# Patient Record
Sex: FEMALE | Race: WHITE | NOT HISPANIC OR LATINO | URBAN - METROPOLITAN AREA
[De-identification: names, ages, dates, MRNs, and addresses within clinical notes are randomized per-mention and may not be internally consistent; named-entity substitution may affect disease eponyms.]

---

## 2020-07-20 ENCOUNTER — APPOINTMENT (OUTPATIENT)
Dept: URBAN - METROPOLITAN AREA CLINIC 200 | Age: 25
Setting detail: DERMATOLOGY
End: 2020-07-23

## 2020-07-20 DIAGNOSIS — L56.2 PHOTOCONTACT DERMATITIS [BERLOQUE DERMATITIS]: ICD-10-CM

## 2020-07-20 PROCEDURE — OTHER CONSENT FOR TELEMEDICINE VISIT OBTAINED: OTHER

## 2020-07-20 PROCEDURE — OTHER COUNSELING: OTHER

## 2020-07-20 PROCEDURE — OTHER PRESCRIPTION: OTHER

## 2020-07-20 PROCEDURE — OTHER TELEHEALTH ASSESSMENT: OTHER

## 2020-07-20 PROCEDURE — OTHER REASON FOR TELEMEDICINE VISIT: OTHER

## 2020-07-20 PROCEDURE — OTHER RECOMMENDATIONS: OTHER

## 2020-07-20 PROCEDURE — 99201: CPT | Mod: 95

## 2020-07-20 RX ORDER — TRIAMCINOLONE ACETONIDE 0.25 MG/G
CREAM TOPICAL
Qty: 1 | Refills: 4 | Status: ERX | COMMUNITY
Start: 2020-07-20

## 2020-07-20 ASSESSMENT — LOCATION ZONE DERM
LOCATION ZONE: HAND
LOCATION ZONE: FINGER

## 2020-07-20 ASSESSMENT — LOCATION SIMPLE DESCRIPTION DERM
LOCATION SIMPLE: RIGHT HAND
LOCATION SIMPLE: RIGHT SMALL FINGER

## 2020-07-20 ASSESSMENT — LOCATION DETAILED DESCRIPTION DERM
LOCATION DETAILED: RIGHT PROXIMAL DORSAL SMALL FINGER
LOCATION DETAILED: 4TH WEB SPACE RIGHT HAND

## 2021-08-24 ENCOUNTER — APPOINTMENT (OUTPATIENT)
Dept: URBAN - METROPOLITAN AREA CLINIC 200 | Age: 26
Setting detail: DERMATOLOGY
End: 2021-08-24

## 2021-08-24 DIAGNOSIS — D18.0 HEMANGIOMA: ICD-10-CM

## 2021-08-24 DIAGNOSIS — D22 MELANOCYTIC NEVI: ICD-10-CM

## 2021-08-24 DIAGNOSIS — D485 NEOPLASM OF UNCERTAIN BEHAVIOR OF SKIN: ICD-10-CM

## 2021-08-24 DIAGNOSIS — Z11.52 ENCOUNTER FOR SCREENING FOR COVID-19: ICD-10-CM

## 2021-08-24 PROBLEM — D22.39 MELANOCYTIC NEVI OF OTHER PARTS OF FACE: Status: ACTIVE | Noted: 2021-08-24

## 2021-08-24 PROBLEM — D48.5 NEOPLASM OF UNCERTAIN BEHAVIOR OF SKIN: Status: ACTIVE | Noted: 2021-08-24

## 2021-08-24 PROBLEM — D18.01 HEMANGIOMA OF SKIN AND SUBCUTANEOUS TISSUE: Status: ACTIVE | Noted: 2021-08-24

## 2021-08-24 PROCEDURE — OTHER SCREENING FOR COVID-19: OTHER

## 2021-08-24 PROCEDURE — 11102 TANGNTL BX SKIN SINGLE LES: CPT

## 2021-08-24 PROCEDURE — OTHER REASSURANCE: OTHER

## 2021-08-24 PROCEDURE — OTHER COUNSELING: OTHER

## 2021-08-24 PROCEDURE — OTHER BIOPSY BY SHAVE METHOD: OTHER

## 2021-08-24 PROCEDURE — OTHER RECOMMENDATIONS: OTHER

## 2021-08-24 PROCEDURE — 99212 OFFICE O/P EST SF 10 MIN: CPT | Mod: 25

## 2021-08-24 ASSESSMENT — LOCATION DETAILED DESCRIPTION DERM
LOCATION DETAILED: LEFT CENTRAL MALAR CHEEK
LOCATION DETAILED: RIGHT MEDIAL INFERIOR EYELID
LOCATION DETAILED: LEFT SUPERIOR LATERAL LOWER BACK
LOCATION DETAILED: PERIUMBILICAL SKIN

## 2021-08-24 ASSESSMENT — PAIN INTENSITY VAS: HOW INTENSE IS YOUR PAIN 0 BEING NO PAIN, 10 BEING THE MOST SEVERE PAIN POSSIBLE?: NO PAIN

## 2021-08-24 ASSESSMENT — LOCATION SIMPLE DESCRIPTION DERM
LOCATION SIMPLE: ABDOMEN
LOCATION SIMPLE: LEFT LOWER BACK
LOCATION SIMPLE: LEFT CHEEK
LOCATION SIMPLE: RIGHT INFERIOR EYELID

## 2021-08-24 ASSESSMENT — LOCATION ZONE DERM
LOCATION ZONE: EYELID
LOCATION ZONE: FACE
LOCATION ZONE: TRUNK

## 2021-08-24 NOTE — PROCEDURE: BIOPSY BY SHAVE METHOD
Hide Additional Anticipated Plan?: No
Silver Nitrate Text: The wound bed was treated with silver nitrate after the biopsy was performed.
Biopsy Type: H and E
Depth Of Biopsy: dermis
X Size Of Lesion In Cm: 0
Validate Note Data (See Information Below): Yes
Post-Care Instructions: I reviewed with the patient in detail post-care instructions. Patient is to keep the biopsy site dry overnight, and then apply bacitracin twice daily until healed. Patient may apply hydrogen peroxide soaks to remove any crusting.
Hemostasis: Drysol
Electrodesiccation Text: The wound bed was treated with electrodesiccation after the biopsy was performed.
Consent: Written consent was obtained and risks were reviewed including but not limited to scarring, infection, bleeding, scabbing, incomplete removal, nerve damage and allergy to anesthesia.
Wound Care: Aquaphor
Biopsy Method: sterile single edge surgical blade
Notification Instructions: Patient will be notified of biopsy results. However, patient instructed to call the office if not contacted within 2 weeks.
Type Of Destruction Used: Curettage
Anesthesia Type: 0.5% lidocaine with 1:200,000 epinephrine
Information: Selecting Yes will display possible errors in your note based on the variables you have selected. This validation is only offered as a suggestion for you. PLEASE NOTE THAT THE VALIDATION TEXT WILL BE REMOVED WHEN YOU FINALIZE YOUR NOTE. IF YOU WANT TO FAX A PRELIMINARY NOTE YOU WILL NEED TO TOGGLE THIS TO 'NO' IF YOU DO NOT WANT IT IN YOUR FAXED NOTE.
Dressing: bandage
Anesthesia Volume In Cc (Will Not Render If 0): 0.5
Billing Type: Third-Party Bill
Curettage Text: The wound bed was treated with curettage after the biopsy was performed.
Cryotherapy Text: The wound bed was treated with cryotherapy after the biopsy was performed.
Electrodesiccation And Curettage Text: The wound bed was treated with electrodesiccation and curettage after the biopsy was performed.
Detail Level: Detailed

## 2021-08-24 NOTE — PROCEDURE: RECOMMENDATIONS
Render Risk Assessment In Note?: yes
Detail Level: Simple
Recommendations (Free Text): Electrolysis for hair removal in mole- referred to Mik[facility]

## 2022-10-17 ENCOUNTER — TELEPHONE (OUTPATIENT)
Dept: GENETICS | Facility: HOSPITAL | Age: 27
End: 2022-10-17
Payer: COMMERCIAL

## 2022-10-17 NOTE — TELEPHONE ENCOUNTER
I left a message for patient to remind them of their genetic counseling appointment tomorrow. I provided directions and gave the number to my direct line.

## 2022-10-18 ENCOUNTER — CLINICAL SUPPORT (OUTPATIENT)
Dept: GENETICS | Facility: HOSPITAL | Age: 27
End: 2022-10-18
Attending: INTERNAL MEDICINE
Payer: COMMERCIAL

## 2022-10-18 DIAGNOSIS — Z71.83 ENCOUNTER FOR NONPROCREATIVE GENETIC COUNSELING: Primary | ICD-10-CM

## 2022-10-18 DIAGNOSIS — Z82.49 FAMILY HISTORY OF BRUGADA SYNDROME: ICD-10-CM

## 2022-10-18 PROCEDURE — 36415 COLL VENOUS BLD VENIPUNCTURE: CPT

## 2022-10-18 PROCEDURE — 96040 HC GENETICS COUNSELING SESSIONS: CPT

## 2022-10-18 NOTE — PROGRESS NOTES
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  Duyen was referred by her father and presented to the session alone.    Personal History:  Past medical, surgical, social and reproductive histories were obtained, reviewed and recorded.     Duyen Carl is a 27 y.o. female of Serbian, Romanian, [place], Slovakian and English descent with primary visit diagnosis of Encounter for nonprocreative genetic counseling [Z71.83]. She has not undergone cardiac evaluation. She does not believe she has had an ECG for any reason. She denies a history of syncope, pre-syncope, palpitations, arrhythmia, seizures or cardiac arrest.    No past medical history on file.   No past medical history pertinent negatives.  Past Surgical History:   Procedure Laterality Date    Lasik      Tonsillectomy       No past surgical history pertinent negatives on file.       Family History:  A detailed family history, including three generations, was reviewed.  See completed family history in pedigree below. Didier oldest paternal uncle underwent genetic testing due to a history of type I Brugada pattern on ECG; her uncle's genetic testing identified a likely pathogenic mutation in the SCN5A gene called c.2674T>A (p.Gtx935Umx), a variant of uncertain significance in the BRAF gene called c.1024A>G (p.Efs350Rwd), a variant of uncertain significance in the TRPM4 gene called c.2665del (p.Zhw188Nmtbj*35), a benign pseudodeficiency allele in the GAA gene called c.271G>A (p.Khp93Wae) and a benign pseudodeficiency allele in the GLA gene called  c.937G>T (p.Dgt713Uik) ([facility] Accession [id number]). Didier father then underwent genetic testing of the SCN5A gene and  was found to have the same mutation, c.2674T>A (p.Ijc861Tbr) ([facility] Accession [id number]).        Genetic Education/Risk Assessment/Counseling:  Information was provided about the relationship between genes and cardiovascular risk. Natural history, risks and inheritance patterns in conjuction with the SCN5A gene were reviewed, as related to Duyen's personal and/or family history.  Related psychosocial aspects were discussed.    Discussion of Genetic Testing:  The pros, cons, and limitations of testing for genetic susceptibility were discussed, including, but not limited to test options, possible results, potential impact on management, and psychosocial aspects.  There may be limited data on the degree of cardiovascular- risk and/or no defined management guidelines associated with some genes.     Given the reported personal and/or family history, genetic testing was offered and accepted. The following testing was ordered:    LAB: [facility]  TEST: SCN5A sequencing and deletion/duplication analysis    Plan:  Duyen Carl was confirmed to have understood the aforementioned information and was assisted with decision making as needed.  Informational and supportive resources were provided. Consent was obtained to share chart note(s) with physicians. Duyen will be contacted via telephone when genetic test results are available. Duyen should contact the program with personal/family history updates and/or to inquire about new information specific to this case.    A total of 30 minutes was spent providing genetic counseling to Duyen.

## 2022-10-18 NOTE — LETTER
10/18/22    Ion Martinez MD  [address] [address]      Dear Dr. Martinez,    I am writing to confirm that your patient, Duyen Carl, received care through my office on 10/18/22. I have enclosed a summary of the care provided to Duyen for your reference.    Please contact me with any questions you may have regarding the visit.    Sincerely,           Shira Pete [facility]      CC: No Recipients

## 2022-10-27 ENCOUNTER — TELEPHONE (OUTPATIENT)
Dept: GENETICS | Age: 27
End: 2022-10-27
Payer: COMMERCIAL

## 2022-10-27 NOTE — TELEPHONE ENCOUNTER
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 Genetic testing was performed. Duyen was contacted by telephone today to discuss genetic test results.    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  This is referred to as a positive result and is associated with genetic risk for Brugada syndrome.          Brugada Syndrome  Brugada syndrome is a condition that causes an irregular heart rhythm (arrhythmia) which can be inherited. Electrical impulses in individuals with Brugada syndrome may become uncoordinated between the two lower chambers of the heart (ventricles) causing decreased blood flow to the heart and brain. This decreased blood flow may result in difficulty breathing, fainting, seizures or sudden death. Symptoms of Brugada syndrome can occur at any age in both men and women, however symptoms usually begin around age 40 and more commonly in men. The severity of symptoms varies between individuals, including those within the same family. Sudden unexplained nocturnal death syndrome (SUNDS) is a condition associated with Brugada syndrome, which is characterized by unexpected cardiac arrest in young adults, usually while at rest or while sleeping. Sudden infant death syndrome (SIDS) is also associated with Brugada syndrome, which is sudden unexplained death in children under age 1, usually during sleep.    A genetic alteration,  called a pathogenic mutation, in one of several genes can cause Brugada syndrome. Mutations are most commonly found in the SCN5A gene.    SCN5A Gene  The SCN5A gene provides instructions for making sodium channels, which transport positively charged sodium atoms (ions) into heart muscle cells. Pathogenic mutations within the SCN5A gene alter the structure or function of the channel, reducing the flow of sodium ions into heart muscle cells, which can lead to an abnormal heart rhythm.    SCN5A mutations are usually inherited in a dominant manner, in which one copy of the SCN5A gene with a pathogenic mutation is inherited from one parent, and a second copy of the SCN5A gene without a mutation is inherited from the other parent. First-degree relatives (parents, siblings and children) each have a 50% chance of having the same mutation. Rarely SCN5A mutations are de melida (mutations are not present in either parent).      Plan:  Upon learning the genetic test result, Duyen elected to schedule a telemedicine follow up appointment on 11/9/2022 to discuss the result and risk-based management guidelines in more detail with program medical director, Juan Head DO. Duyen was encouraged to contact the Cardiovascular Risk Assessment and Genetics Program at  [phone] with questions prior to the appointment.

## 2022-10-27 NOTE — LETTER
10/27/22    Ion Martinez MD  [address] [address]      Dear Dr. Martinez,    I am writing to confirm that your patient, Duyen Carl, received care through my office on 10/27/22. I have enclosed a summary of the care provided to Duyen for your reference.    Please contact me with any questions you may have regarding the visit.    Sincerely,           Shira Pete [facility]      CC: No Recipients

## 2022-10-28 LAB
ABNORMALITY: ABNORMAL
LYMPH SUBSET INTERP BLD FC-IMP: ABNORMAL
SCAN RESULT: ABNORMAL

## 2022-11-16 ENCOUNTER — TELEMEDICINE (OUTPATIENT)
Dept: GENETICS | Facility: HOSPITAL | Age: 27
End: 2022-11-16
Attending: INTERNAL MEDICINE
Payer: COMMERCIAL

## 2022-11-16 ENCOUNTER — TELEPHONE (OUTPATIENT)
Dept: CARDIOLOGY | Facility: CLINIC | Age: 27
End: 2022-11-16
Payer: COMMERCIAL

## 2022-11-16 DIAGNOSIS — Z15.89 MONOALLELIC MUTATION OF SCN5A GENE: ICD-10-CM

## 2022-11-16 DIAGNOSIS — Z71.83 ENCOUNTER FOR NONPROCREATIVE GENETIC COUNSELING: Primary | ICD-10-CM

## 2022-11-16 PROCEDURE — 99214 OFFICE O/P EST MOD 30 MIN: CPT | Mod: 95 | Performed by: INTERNAL MEDICINE

## 2022-11-16 NOTE — PROGRESS NOTES
Date of Service: 2022  Patient Name: Duyen Carl  : 1995         Telemedicine Consent:    Prior to commencing the session, Duyen Carl provided verbal consent to have genetic counseling via telemedicine using [employer], which is a telemedicine platform being utilized to provide care during the COVID-19 pandemic. Duyen Carl understands the session will be billed to insurance or to them directly if uninsured or if not covered by insurance. Duyen Carl was informed that the clinician is the only provider on?the video conference, sessions are not recorded by the clinician, and the patient is not permitted to record the session.? She was provided a unique meeting ID prior to session. The clinician confirmed identification of patient by name and birthdate, confirmed patient location, support person(s) present, and obtained a callback number in case disconnected.     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 Duyen Carl was contacted by telemedicine encounter today to discuss genetic test results, management guidelines and any potential additional test options. Follow up appointments to discuss the results in more detail with our medical director may be scheduled by contacting the Cardiovascular Risk Assessment and Genetics Program. 30 minutes were spent on this date of service performing the following activities: preparing for visit, reviewing records, independently reviewing studies, obtaining history, communicating results, coordinating care, providing counseling and education and documenting.     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  This is referred to as a positive result and is associated with genetic risk for Brugada syndrome.            Brugada Syndrome  Brugada syndrome is a condition that causes an irregular heart rhythm (arrhythmia) which can be inherited. Electrical impulses in individuals with Brugada syndrome may become uncoordinated between the two lower chambers of the heart (ventricles) causing decreased blood flow to the heart and brain. This decreased blood flow may result in difficulty breathing, fainting, seizures or sudden death. Symptoms of Brugada syndrome can occur at any age in both men and women, however symptoms usually begin around age 40 and more commonly in men. The severity of symptoms varies between individuals, including those within the same family. Sudden unexplained nocturnal death syndrome (SUNDS) is a condition associated with Brugada syndrome, which is characterized by unexpected cardiac arrest in young adults, usually while at rest or while sleeping. Sudden infant death syndrome (SIDS) is also associated with Brugada syndrome, which is sudden unexplained death in children under age 1, usually during sleep.     A genetic alteration, called a pathogenic mutation, in one of several genes can cause Brugada syndrome. Mutations are most commonly found in the SCN5A gene.     SCN5A Gene  The SCN5A gene provides instructions for making sodium channels, which transport positively charged sodium atoms (ions) into heart muscle cells. Pathogenic mutations within the SCN5A gene alter the structure or function of the channel, reducing the flow of sodium ions into heart muscle cells, which can lead to an abnormal heart rhythm.     SCN5A mutations are usually inherited in a dominant  manner, in which one copy of the SCN5A gene with a pathogenic mutation is inherited from one parent, and a second copy of the SCN5A gene without a mutation is inherited from the other parent. First-degree relatives (parents, siblings and children) each have a 50% chance of having the same mutation. Rarely SCN5A mutations are de melida (mutations are not present in either parent).      Personal History:   Duyen is a 27 y.o. female of Kosovan, Cymro, [place], Slovakian and English descent . She has not undergone cardiac evaluation. She does not believe she has had an ECG for any reason. She denies a history of syncope, pre-syncope, palpitations, arrhythmia, seizures or cardiac arrest.    No past medical history on file.  No past medical history pertinent negatives.      Past Surgical History:   Procedure Laterality Date    Lasik      Tonsillectomy           Social History     Tobacco Use    Smoking status: Never   Substance Use Topics    Alcohol use: Yes     Alcohol/week: 0.0 - 1.0 standard drinks    Drug use: Never       Family History:  A detailed family history, including three generations, was reviewed.  See completed family history in pedigree below. Didier oldest paternal uncle underwent genetic testing due to a history of type I Brugada pattern on ECG; her uncle's genetic testing identified a likely pathogenic mutation in the SCN5A gene called c.2674T>A (p.Xjo923Wqd), a variant of uncertain significance in the BRAF gene called c.1024A>G (p.Bgt946Dlc), a variant of uncertain significance in the TRPM4 gene called c.2665del (p.Kkb257Qmyjs*35), a benign pseudodeficiency allele in the GAA gene called c.271G>A (p.Lru57Vjb) and a benign pseudodeficiency allele in the GLA gene called  c.937G>T (p.Nek095Bww) ([facility] Accession [id number]). Didier father then underwent genetic testing of the SCN5A gene and was found to have the same mutation, c.2674T>A (p.Cbv667Alq) ([facility] Accession  [id number]).        Risk assessment and management:  Based on the genetic finding and the personal/family history obtained, the following strategies for cardiovascular risk management were reviewed.    Screening Guidelines for Brugada Syndrome:  Guidelines written by the Heart Rhythm Society (HRS), the  Heart Rhythm Association (EHRA), and the [place] Heart Rhythm Society (APHRS) on the management of Brugada syndrome are reviewed below [Brent et al, 2013].  Additional cardiovascular screening for individuals with Brugada syndrome may be warranted based on personal and/or family history or other clinical factors and should be modified on a case by case basis. Genetic counseling was provided to facilitate decision-making regarding health maintenance.      1.  The following lifestyle changes are recommended in all patients with diagnosis of Brugada syndrome:  a) Avoidance of drugs that may induce or aggravate ST-segment elevation in right precordial leads (for example, visit http://www.Brugadadrugs.org)  b) Avoidance of excessive alcohol intake.  c) Immediate treatment of fever with antipyretic drugs.  2.  ICD implantation is recommended in patients with a diagnosis of Brugada syndrome who:  a) Are survivors of a cardiac arrest and/or  b) Have documented spontaneous sustained ventricular tachycardia with or without syncope.      3.  ICD implantation can be useful in patients with a spontaneous diagnostic type I ECG who have a history of syncope judged to be likely caused by ventricular arrhythmias.  4.  Quinidine can be useful in patients with a diagnosis of Brugada syndrome and history of arrhythmic storms defined as more than two episodes of ventricular tachycardia/ventricular fibrillation in 24 hours.  5.  Quinidine can be useful in patients with a diagnosis of Brugada syndrome:  a) Who qualify for an ICD but present a contraindication to the ICD or refuse it and/or  b) Have a history of documented  supraventricular arrhythmias that require treatment.  6.  Isoproterenol infusion can be useful in suppressing arrhythmic storms in Brugada syndrome patients.      7.  ICD implantation may be considered in patients with a diagnosis of Brugada syndrome who develop VF during programmed electrical stimulation (inducible patients).  8.  Quinidine may be considered in asymptomatic patients with a diagnosis of Brugada syndrome with a spontaneous type I ECG.  9.  Catheter ablation may be considered in patients with a diagnosis of Brugada syndrome and history of arrhythmic storms or repeated appropriate ICD shocks.      10.  ICD implantation is not indicated in asymptomatic Brugada syndrome patients with a drug-induced type I ECG and on the basis of a family history of SCD alone.    Other Relevant Information for SCN5A Gene Carriers:   Since a deleterious/pathogenic genetic mutation was identified, other family members are at risk to have inherited the same mutation.  First-degree relatives (parents, siblings and children) each have a 50% chance of having the same mutation. It is encouraged to share this information with family members. Confirming the lineage of the mutation, if not already known, is recommended.  In families with confirmed SCN5A mutations, cardiovascular risks in those who test negative may still be somewhat higher than average, due to the impact of modifying factors also shared by family members. Because cardiovascular disease risk is based on both personal and both maternal and paternal family history factors, as well as mutation status, other relatives are encouraged to consider risk assessment and/or genetic evaluation to derive a risk-appropriate, individualized plan.   Should family member(s) be interested in genetic evaluation, the Genetics and Risk Assessment Program can provide consultation or help to find a genetic counselor in their area.  For individuals of childbearing age, options for  prenatal diagnosis and assisted reproduction exist for family members who are found to harbor the mutation in the childbearing age.     Studies regarding cardiovascular risk and management for SCN5A carriers remain underway. Individuals with an SCN5A mutation are encouraged to consider participating in research registries and/or clinical trials.      Plan:    1. Continued family testing for your SCN5A mutation is recommended for living cousins on your paternal grandfather's side of the family.     2. Check all prescribed and over-the-counter medications against BrugadaDrugs.org. Request alternatives if medication can cause elevated risk.     3. Avoid excess alcohol or caffeine use.     4. Treat fevers aggressively with Tylenol (Acetaminophen) and recheck to ensure temperature stays down.     5. Make all treating healthcare providers aware of your SCN5A mutation.     Because cardiovascular risk is based on mutation status as well as on personal and both maternal and paternal family history factors, other relatives are encouraged to consider risk assessment and/or genetic evaluation to derive a risk-appropriate, individualized plan.  Should family member(s) be interested in genetic evaluation, the Cardiovascular Genetics Program can provide consultation or help to find a genetic counselor in their area.    The information provided reflects current practice guidelines and may change with new medical discoveries/technology/updated personal or family history information. Duyen was confirmed to have understood the aforementioned information and was assisted with decision making as needed.  Informational and supportive resources were provided.  Consent was obtained to share chart note(s) with physicians.  Duyen plans to share the above information with physicians to determine an optimal management plan.    Duyen should contact the program with person/family history updates as this could alter the  information/assessment provided including available test options and/or to inquire about new information specific to this case.  Duyen was also encouraged to contact the program at [phone] ([phone] with any questions or concerns and/or to periodically review test options and related insurance coverage if interested.